# Patient Record
Sex: MALE | Race: WHITE
[De-identification: names, ages, dates, MRNs, and addresses within clinical notes are randomized per-mention and may not be internally consistent; named-entity substitution may affect disease eponyms.]

---

## 2024-05-01 PROBLEM — Z00.00 ENCOUNTER FOR PREVENTIVE HEALTH EXAMINATION: Status: ACTIVE | Noted: 2024-05-01

## 2024-05-13 NOTE — ASSESSMENT
[FreeTextEntry1] : 49 y/o male with BPH, and elevated psa, ? family hx of prostate cancer  [facility] ***  Thank you very much for allowing me to assist in the care of this patient. Please do not hesitate to contact me with any additional questions or concerns.   Sincerely,   Jose Daniel Stafford D.O. Professor of Urology and Radiology  of Urology at [facility] Director for Prostate Cancer [address] [place] [address], 5[address] Floor [facility], [facility] Phone: [phone].

## 2024-05-13 NOTE — HISTORY OF PRESENT ILLNESS
[FreeTextEntry1] :  Dear Dr. Lucas Hebert,   Thank you so much for the referral to help care for your patient.  Chief Complaint: elevated psa *** Date of first visit: 5/15/24  Mr. Susanne Calhoun is a 48 year old *** man who presents for elevated PSA of 4.9?  + family hx for prostate cancer?   PMHx:    SxHx:  left shoulder sx '99 left inguinal hernia repair at 19 y/o  ventral hernia repair 2/2015 left knee meniscus repair 11/2015  FHx:   Father with sarcoma  SocHx:   Allergies:  NKDA   Medications:  Tamsulosin 0.4mg  Sildenafil 20mg   PSA Hx:   4.9 3/7/24 PSAD 0.11 4.2 1/29/24   MRI in [facility] on 1/15/24 44cc PIRADS 1, no focal lesion suspicious for prostate cancer, BPH nodules throughout TZ   05/15/2024 IPSS       QOL HARDIK  1/22/24 IPSS QOL HARDIK   The patient denies fevers, chills, nausea and or vomiting and no unexplained weight loss.  All pertinent laboratory, films and physician notes were reviewed. Questionnaire results were discussed with patient.

## 2024-05-15 ENCOUNTER — APPOINTMENT (OUTPATIENT)
Dept: UROLOGY | Facility: CLINIC | Age: 49
End: 2024-05-15
Payer: MEDICAID

## 2024-05-15 VITALS
WEIGHT: 165 LBS | HEIGHT: 69 IN | HEART RATE: 60 BPM | BODY MASS INDEX: 24.44 KG/M2 | OXYGEN SATURATION: 98 % | DIASTOLIC BLOOD PRESSURE: 80 MMHG | SYSTOLIC BLOOD PRESSURE: 131 MMHG | TEMPERATURE: 98.1 F

## 2024-05-15 DIAGNOSIS — R97.20 ELEVATED PROSTATE, SPECIFIC ANTIGEN [PSA]: ICD-10-CM

## 2024-05-15 PROCEDURE — 99214 OFFICE O/P EST MOD 30 MIN: CPT

## 2024-05-15 NOTE — PHYSICAL EXAM
[General Appearance - In No Acute Distress] : no acute distress [] : no respiratory distress [Penis Abnormality] : normal circumcised penis [Prostate Tenderness] : the prostate was not tender [No Prostate Nodules] : no prostate nodules [Normal Station and Gait] : the gait and station were normal for the patient's age [Oriented To Time, Place, And Person] : oriented to person, place, and time

## 2024-05-21 ENCOUNTER — NON-APPOINTMENT (OUTPATIENT)
Age: 49
End: 2024-05-21